# Patient Record
Sex: MALE | Race: WHITE | NOT HISPANIC OR LATINO | URBAN - METROPOLITAN AREA
[De-identification: names, ages, dates, MRNs, and addresses within clinical notes are randomized per-mention and may not be internally consistent; named-entity substitution may affect disease eponyms.]

---

## 2023-05-17 ENCOUNTER — APPOINTMENT (OUTPATIENT)
Dept: LAB | Facility: CLINIC | Age: 23
End: 2023-05-17

## 2023-05-17 ENCOUNTER — OFFICE VISIT (OUTPATIENT)
Dept: GASTROENTEROLOGY | Facility: CLINIC | Age: 23
End: 2023-05-17

## 2023-05-17 VITALS
TEMPERATURE: 98.9 F | SYSTOLIC BLOOD PRESSURE: 110 MMHG | BODY MASS INDEX: 20.99 KG/M2 | WEIGHT: 155 LBS | DIASTOLIC BLOOD PRESSURE: 70 MMHG | HEIGHT: 72 IN

## 2023-05-17 DIAGNOSIS — K58.2 IRRITABLE BOWEL SYNDROME WITH BOTH CONSTIPATION AND DIARRHEA: Primary | ICD-10-CM

## 2023-05-17 DIAGNOSIS — E73.9 LACTOSE INTOLERANCE: ICD-10-CM

## 2023-05-17 DIAGNOSIS — K58.2 IRRITABLE BOWEL SYNDROME WITH BOTH CONSTIPATION AND DIARRHEA: ICD-10-CM

## 2023-05-17 LAB
ALBUMIN SERPL BCP-MCNC: 4.6 G/DL (ref 3.5–5)
ALP SERPL-CCNC: 78 U/L (ref 46–116)
ALT SERPL W P-5'-P-CCNC: 20 U/L (ref 12–78)
ANION GAP SERPL CALCULATED.3IONS-SCNC: -2 MMOL/L (ref 4–13)
AST SERPL W P-5'-P-CCNC: 13 U/L (ref 5–45)
BASOPHILS # BLD AUTO: 0.02 THOUSANDS/ÂΜL (ref 0–0.1)
BASOPHILS NFR BLD AUTO: 0 % (ref 0–1)
BILIRUB SERPL-MCNC: 1.05 MG/DL (ref 0.2–1)
BUN SERPL-MCNC: 12 MG/DL (ref 5–25)
CALCIUM SERPL-MCNC: 9.8 MG/DL (ref 8.3–10.1)
CHLORIDE SERPL-SCNC: 104 MMOL/L (ref 96–108)
CO2 SERPL-SCNC: 31 MMOL/L (ref 21–32)
CREAT SERPL-MCNC: 1.17 MG/DL (ref 0.6–1.3)
CRP SERPL QL: <3 MG/L
EOSINOPHIL # BLD AUTO: 0.06 THOUSAND/ÂΜL (ref 0–0.61)
EOSINOPHIL NFR BLD AUTO: 1 % (ref 0–6)
ERYTHROCYTE [DISTWIDTH] IN BLOOD BY AUTOMATED COUNT: 12.5 % (ref 11.6–15.1)
GFR SERPL CREATININE-BSD FRML MDRD: 87 ML/MIN/1.73SQ M
GLUCOSE SERPL-MCNC: 81 MG/DL (ref 65–140)
HCT VFR BLD AUTO: 47.2 % (ref 36.5–49.3)
HGB BLD-MCNC: 16.5 G/DL (ref 12–17)
IMM GRANULOCYTES # BLD AUTO: 0.01 THOUSAND/UL (ref 0–0.2)
IMM GRANULOCYTES NFR BLD AUTO: 0 % (ref 0–2)
LYMPHOCYTES # BLD AUTO: 1.06 THOUSANDS/ÂΜL (ref 0.6–4.47)
LYMPHOCYTES NFR BLD AUTO: 20 % (ref 14–44)
MCH RBC QN AUTO: 30.2 PG (ref 26.8–34.3)
MCHC RBC AUTO-ENTMCNC: 35 G/DL (ref 31.4–37.4)
MCV RBC AUTO: 86 FL (ref 82–98)
MONOCYTES # BLD AUTO: 0.73 THOUSAND/ÂΜL (ref 0.17–1.22)
MONOCYTES NFR BLD AUTO: 14 % (ref 4–12)
NEUTROPHILS # BLD AUTO: 3.38 THOUSANDS/ÂΜL (ref 1.85–7.62)
NEUTS SEG NFR BLD AUTO: 65 % (ref 43–75)
NRBC BLD AUTO-RTO: 0 /100 WBCS
PLATELET # BLD AUTO: 227 THOUSANDS/UL (ref 149–390)
PMV BLD AUTO: 10.1 FL (ref 8.9–12.7)
POTASSIUM SERPL-SCNC: 4.4 MMOL/L (ref 3.5–5.3)
PROT SERPL-MCNC: 8.5 G/DL (ref 6.4–8.4)
RBC # BLD AUTO: 5.46 MILLION/UL (ref 3.88–5.62)
SODIUM SERPL-SCNC: 133 MMOL/L (ref 135–147)
WBC # BLD AUTO: 5.26 THOUSAND/UL (ref 4.31–10.16)

## 2023-05-17 NOTE — PROGRESS NOTES
[facility] - Outpatient Consultation  Gladis Coreas 21 y o  male MRN: [id number]  Encounter: [id number]          ASSESSMENT AND PLAN:      1  Irritable bowel syndrome with both constipation and diarrhea  2  Lactose intolerance    He has episode of constipation followed by diarrhea  Also reports abdominal bloating and lactose intolerance  His symptoms are likely secondary to irritable bowel syndrome  We will get celiac panel to rule out celiac disease and CRP, fecal calprotectin to rule out inflammatory bowel disease  Continue taking Benefiber  MiraLAX for severe constipation  Trial of low FODMAP diet  If his symptoms persist without significant improvement will consider endoscopic evaluation  Follow-up in 6 months  - Celiac Disease Antibody Profile; Future  - C-reactive protein; Future  - Calprotectin,Fecal; Future    ______________________________________________________________________    HPI: 30-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 [employer] here for evaluation of abdominal pain and change in bowel habits  He reports recurrent abdominal pain and constipation for about 2 years  He usually gets constipated for about 2 days followed by loose bowel movements  He also reports intolerance to lactose  He reports loose bowel movement and skin rash/acne after taking lactulose  He tried lactase pill in the beginning which was helping but lately reports more symptoms  He does have some abdominal bloating    He also tried probiotics  He takes Benefiber every morning  Family history of celiac disease -his cousin  No known family history of inflammatory bowel disease  He denies weight loss, nausea, vomiting, melena or hematochezia  No prior EGD or colonoscopy    He has seasonal allergy and currently takes -Zyrtec and flonase    Answers for HPI/ROS submitted by the patient on 5/17/2023  Chronicity: recurrent  Onset: more than 1 year ago  Onset quality: gradual  Frequency: intermittently  Episode duration: 3 Days  Progression since onset: gradually worsening  Pain location: generalized abdominal region  Pain - numeric: 3/10  Pain quality: cramping  anorexia: No  arthralgias: No  belching: No  constipation: Yes  diarrhea: Yes  dysuria: No  fever: No  flatus: Yes  frequency: No  headaches: No  hematochezia: No  hematuria: No  melena: No  myalgias: No  nausea: No  weight loss: No  vomiting: No  Aggravated by: eating  Relieved by: nothing        REVIEW OF SYSTEMS:    CONSTITUTIONAL: Denies any fever, chills, rigors, and weight loss  HEENT: No earache or tinnitus  Denies hearing loss or visual disturbances  CARDIOVASCULAR: No chest pain or palpitations  RESPIRATORY: Denies any cough, hemoptysis, shortness of breath or dyspnea on exertion  GASTROINTESTINAL: As noted in the History of Present Illness  GENITOURINARY: No problems with urination  Denies any hematuria or dysuria  NEUROLOGIC: No dizziness or vertigo, denies headaches  MUSCULOSKELETAL: Denies any muscle or joint pain  SKIN: Denies skin rashes or itching  ENDOCRINE: Denies excessive thirst  Denies intolerance to heat or cold  PSYCHOSOCIAL: Denies depression or anxiety  Denies any recent memory loss  Historical Information   History reviewed  No pertinent past medical history  History reviewed  No pertinent surgical history  Social History   Social History     Substance and Sexual Activity   Alcohol Use Yes   • Alcohol/week: 1 0 standard drink   • Types: 1 Standard drinks or equivalent per week     Social History     Substance and Sexual Activity   Drug Use Not on file     Social History     Tobacco Use   Smoking Status Never   Smokeless Tobacco Never     History reviewed  No pertinent family history  Meds/Allergies     No current outpatient medications on file      No Known Allergies        Objective     Blood pressure 110/70, temperature 98 9 °F (37 2 °C), temperature source Tympanic, height 6' (1 829 m), weight 70 3 kg (155 lb)  Body mass index is 21 02 kg/m²  PHYSICAL EXAM:      General Appearance:   Alert, cooperative, no distress   HEENT:   Normocephalic, atraumatic, anicteric      Neck:  Supple, symmetrical, trachea midline   Lungs:   Clear to auscultation bilaterally; no rales, rhonchi or wheezing; respirations unlabored    Heart[de-identified]   Regular rate and rhythm; no murmur, rub, or gallop  Abdomen:   Soft, non-tender, non-distended; normal bowel sounds; no masses, no organomegaly    Genitalia:   Deferred    Rectal:   Deferred    Extremities:  No cyanosis, clubbing or edema    Pulses:  2+ and symmetric    Skin:  No jaundice, rashes, or lesions    Lymph nodes:  No palpable cervical lymphadenopathy        Lab Results:   No results found for any previous visit  Radiology Results:   No results found

## 2023-05-18 ENCOUNTER — APPOINTMENT (OUTPATIENT)
Dept: LAB | Facility: CLINIC | Age: 23
End: 2023-05-18

## 2023-05-18 DIAGNOSIS — K58.2 IRRITABLE BOWEL SYNDROME WITH BOTH CONSTIPATION AND DIARRHEA: ICD-10-CM

## 2023-05-18 LAB
ENDOMYSIUM IGA SER QL: NEGATIVE
GLIADIN PEPTIDE IGA SER-ACNC: 5 UNITS (ref 0–19)
GLIADIN PEPTIDE IGG SER-ACNC: 4 UNITS (ref 0–19)
IGA SERPL-MCNC: 121 MG/DL (ref 90–386)
TTG IGA SER-ACNC: <2 U/ML (ref 0–3)
TTG IGG SER-ACNC: 2 U/ML (ref 0–5)

## 2023-05-28 LAB — CALPROTECTIN STL-MCNT: 30 UG/G (ref 0–120)
